# Patient Record
Sex: MALE | Race: WHITE | NOT HISPANIC OR LATINO | Employment: FULL TIME | URBAN - METROPOLITAN AREA
[De-identification: names, ages, dates, MRNs, and addresses within clinical notes are randomized per-mention and may not be internally consistent; named-entity substitution may affect disease eponyms.]

---

## 2021-06-30 ENCOUNTER — APPOINTMENT (OUTPATIENT)
Dept: GENERAL RADIOLOGY | Facility: HOSPITAL | Age: 46
End: 2021-06-30

## 2021-06-30 VITALS
HEIGHT: 69 IN | SYSTOLIC BLOOD PRESSURE: 102 MMHG | DIASTOLIC BLOOD PRESSURE: 70 MMHG | BODY MASS INDEX: 25.99 KG/M2 | WEIGHT: 175.49 LBS | OXYGEN SATURATION: 97 % | TEMPERATURE: 98.6 F | RESPIRATION RATE: 20 BRPM | HEART RATE: 81 BPM

## 2021-06-30 PROCEDURE — 99283 EMERGENCY DEPT VISIT LOW MDM: CPT

## 2021-06-30 PROCEDURE — 73030 X-RAY EXAM OF SHOULDER: CPT

## 2021-07-01 ENCOUNTER — HOSPITAL ENCOUNTER (EMERGENCY)
Facility: HOSPITAL | Age: 46
Discharge: HOME | End: 2021-07-01
Attending: EMERGENCY MEDICINE | Admitting: EMERGENCY MEDICINE

## 2021-07-01 DIAGNOSIS — M25.511 ACUTE PAIN OF RIGHT SHOULDER: Primary | ICD-10-CM

## 2021-07-01 NOTE — DISCHARGE INSTRUCTIONS
Cold compresses to the shoulder.  Apply for 20 to 30 minutes 3-5 times per day for the next 3 to 5 days.  After 3 days you may try warm compresses as well.  Use sling for comfort.  Avoid any strenuous activities or lifting greater than 5 to 10 pounds.  Follow-up with your family doctor in 7 to 10 days if not improved or orthopedics.  I did provide you with our on-call orthopedic doctor Dr. Sheets that you may call his office for follow-up as well.

## 2021-07-01 NOTE — ED PROVIDER NOTES
Subjective   Patient is a 46-yo male who presents to the ED with c/o right shoulder pain that started earlier today.  He states that yesterday he had worked out but there is no known injury and then he had flipped his bed mattress around.  Patient is he feels that he possibly slept on his shoulder wrong.  Patient is he woke up this morning it did not feel right and it felt stiff and then around 1 PM today he started having pain in the shoulder. Pt reports that the pain progressed as the day went on and it's become unbearable. Pt notes that it feels like it's swollen.  He denies any direct trauma or injury.  He denies any prior shoulder problems patient is a pain is worse with movement and is improved at rest.    PCP- Provider, No Known            Review of Systems   Constitutional: Negative for chills and fever.   HENT: Negative for nosebleeds.    Eyes: Negative for redness.   Respiratory: Negative for cough and shortness of breath.    Cardiovascular: Negative for chest pain.   Gastrointestinal: Negative for diarrhea and vomiting.   Genitourinary: Negative for dysuria and frequency.   Musculoskeletal: Negative for back pain and neck pain.   Skin: Negative for rash.   Neurological: Negative for seizures.   All other systems reviewed and are negative.      History reviewed. No pertinent past medical history.    No Known Allergies    Past Surgical History:   Procedure Laterality Date   • HERNIA REPAIR         History reviewed. No pertinent family history.    Social History     Socioeconomic History   • Marital status:      Spouse name: Not on file   • Number of children: Not on file   • Years of education: Not on file   • Highest education level: Not on file   Tobacco Use   • Smoking status: Never Smoker   • Smokeless tobacco: Never Used   Substance and Sexual Activity   • Alcohol use: Yes     Comment: OCCASIONALLY   • Drug use: Never         Objective   Physical Exam  Vitals and nursing note reviewed.    Constitutional:       General: He is awake. He is not in acute distress.  HENT:      Head: Normocephalic and atraumatic.      Nose: Nose normal.      Mouth/Throat:      Mouth: Mucous membranes are moist.   Eyes:      General: No scleral icterus.     Pupils: Pupils are equal, round, and reactive to light.   Cardiovascular:      Rate and Rhythm: Normal rate and regular rhythm.      Pulses: Normal pulses.      Heart sounds: Normal heart sounds. No murmur heard.     Pulmonary:      Effort: Pulmonary effort is normal. No respiratory distress.      Breath sounds: Normal breath sounds and air entry. No decreased breath sounds, wheezing, rhonchi or rales.   Chest:      Chest wall: No tenderness.   Abdominal:      Palpations: Abdomen is soft.      Tenderness: There is no abdominal tenderness. There is no guarding or rebound.      Hernia: No hernia is present.   Musculoskeletal:         General: No tenderness.      Right shoulder: No swelling or bony tenderness. Decreased range of motion (due to pain). Normal pulse.      Cervical back: Normal range of motion and neck supple. No tenderness.      Right lower leg: No edema.      Left lower leg: No edema.      Comments: Posterior aspect of right shoulder. No lesions.    Skin:     General: Skin is warm and dry.   Neurological:      Mental Status: He is alert and oriented to person, place, and time. Mental status is at baseline.      Sensory: Sensation is intact. No sensory deficit.      Motor: Motor function is intact. No weakness.   Psychiatric:         Behavior: Behavior normal.         Procedures         ED Course          Patient was seen evaluated in the ED.  X-rays were negative.  Patient has decreased active and passive range of motion with most of the pain being in the posterior aspect of the shoulder.  There is no swelling or other visible changes to the shoulder.  At this point time it is most likely more of a rotator cuff strain but explained to the patient that  either a labrum or rotator cuff tear may need to be further evaluate if symptoms not improved.  I would start off with conservative treatment with a sling ice and anti-inflammatories if symptoms are better in a week and follow with his PCP to see about getting an MRI or an orthopedic referral.  Patient is aware this treatment plan agreeable to such.                                  MDM    Final diagnoses:   Acute pain of right shoulder       Documentation assistance provided by pina Valdes.  Information recorded by the scribe was done at my direction and has been verified and validated by me.     Moraima Valdes  07/01/21 0049       Justo Campbell DO  07/01/21 0201